# Patient Record
Sex: FEMALE | NOT HISPANIC OR LATINO | Employment: UNEMPLOYED | URBAN - METROPOLITAN AREA
[De-identification: names, ages, dates, MRNs, and addresses within clinical notes are randomized per-mention and may not be internally consistent; named-entity substitution may affect disease eponyms.]

---

## 2022-08-08 ENCOUNTER — TRANSFERRED RECORDS (OUTPATIENT)
Dept: HEALTH INFORMATION MANAGEMENT | Facility: CLINIC | Age: 37
End: 2022-08-08

## 2022-08-08 ENCOUNTER — MEDICAL CORRESPONDENCE (OUTPATIENT)
Dept: HEALTH INFORMATION MANAGEMENT | Facility: CLINIC | Age: 37
End: 2022-08-08

## 2022-09-15 ENCOUNTER — HOSPITAL ENCOUNTER (OUTPATIENT)
Dept: CARDIOLOGY | Facility: CLINIC | Age: 37
Discharge: HOME | End: 2022-09-15
Admitting: PEDIATRICS
Payer: COMMERCIAL

## 2022-09-15 ENCOUNTER — OFFICE VISIT (OUTPATIENT)
Dept: CARDIOLOGY | Facility: CLINIC | Age: 37
End: 2022-09-15
Payer: COMMERCIAL

## 2022-09-15 DIAGNOSIS — O09.529 AMA (ADVANCED MATERNAL AGE) MULTIGRAVIDA 35+: ICD-10-CM

## 2022-09-15 DIAGNOSIS — O24.410 DIET CONTROLLED GESTATIONAL DIABETES MELLITUS (GDM) IN THIRD TRIMESTER: Primary | ICD-10-CM

## 2022-09-15 DIAGNOSIS — O24.419 GDM (GESTATIONAL DIABETES MELLITUS): ICD-10-CM

## 2022-09-15 PROCEDURE — 93325 DOPPLER ECHO COLOR FLOW MAPG: CPT | Mod: 26 | Performed by: PEDIATRICS

## 2022-09-15 PROCEDURE — 99204 OFFICE O/P NEW MOD 45 MIN: CPT | Mod: 25 | Performed by: PEDIATRICS

## 2022-09-15 PROCEDURE — 76825 ECHO EXAM OF FETAL HEART: CPT | Mod: 26 | Performed by: PEDIATRICS

## 2022-09-15 PROCEDURE — 76827 ECHO EXAM OF FETAL HEART: CPT | Mod: 26 | Performed by: PEDIATRICS

## 2022-09-15 PROCEDURE — 93325 DOPPLER ECHO COLOR FLOW MAPG: CPT

## 2022-09-15 NOTE — PROGRESS NOTES
Fetal Cardiology Consultation    Patient:  Yaz Childress MRN:  [id number]   YOB: 1985 Age:  37 year old   Date of Visit:  9/15/2022 PCP:  No primary care provider on file.   SABINA: 12/10/22 EGA: 27+5 weeks     Dear Dr. Parry :    I had the pleasure of seeing Yaz Childress at the [facility] in [place] on 9/15/2022 in consultation for fetal echocardiography results. She presented today accompanied by her partner; today's visit was facilitated through an . As you know, she is a 37 year old female with gestational diabetes.    The fetal echocardiogram was normal. Normal fetal cardiac anatomy. Normal right and left ventricular size and function without hypertrophy. No evidence of diastolic dysfunction. No pericardial effusion. No arrhythmia.     I reviewed and interpreted the fetal echocardiogram today. I discussed the normal results with Ms. Monroe Childress and her partner with an . While these results are normal, it is important to note that fetal echocardiography cannot exclude small atrial or ventricular septal defects, persistent ductus arteriosus, mild coarctation of the aorta, partial anomalous pulmonary venous return, minor anatomic valve anomalies, or coronary artery anomalies.     Thank you for allowing me to participate in Ms. Monroe Childress's care. Please don't hesitate to contact me or the Fetal Cardiology team at [facility] with any questions or concerns.     I spent a total of 40 minutes on the date of the encounter doing chart review, patient history, documentation, counseling, and coordinating care.    Jamarcus Jon MD  Pediatric Cardiology  [facility]  Phone [phone]    Review of prior external note(s) from - Outside records from [facility]  Review of the result(s) of each unique test - fetal echocardiogram

## 2022-11-14 ENCOUNTER — VIRTUAL VISIT (OUTPATIENT)
Dept: UROLOGY | Facility: CLINIC | Age: 37
End: 2022-11-14
Payer: COMMERCIAL

## 2022-11-14 VITALS — WEIGHT: 166 LBS

## 2022-11-14 DIAGNOSIS — O35.EXX0 FETAL HYDRONEPHROSIS DURING PREGNANCY, ANTEPARTUM, SINGLE OR UNSPECIFIED FETUS: Primary | ICD-10-CM

## 2022-11-14 PROCEDURE — 99443 PR PHYSICIAN TELEPHONE EVALUATION 21-30 MIN: CPT | Performed by: NURSE PRACTITIONER

## 2022-11-14 RX ORDER — LEVOTHYROXINE SODIUM 50 UG/1
TABLET ORAL
COMMUNITY
Start: 2022-08-22

## 2022-11-14 RX ORDER — METFORMIN HCL 500 MG
TABLET, EXTENDED RELEASE 24 HR ORAL
COMMUNITY
Start: 2022-09-15

## 2022-11-14 ASSESSMENT — PAIN SCALES - GENERAL: PAINLEVEL: NO PAIN (0)

## 2022-11-14 NOTE — PROGRESS NOTES
Yaz is a 37 year old who is being evaluated via a billable telephone visit.      What phone number would you like to be contacted at? [phone]  How would you like to obtain your AVS? Mail a copy

## 2022-11-14 NOTE — PATIENT INSTRUCTIONS
[facility]   Department of Pediatric Urology    Dr. Hari Llamas, Pediatric Urologist  Osorio Moctezuma, CPNP  Swati Curiel, CADYNP    [facility] [place]  Nurse Coordinator: Mychal Pritchett -[phone]    [facility]  Nurse Coordinator: [phone]    [facility] [employer]  Nurse Coordinator: Lesley Fletcher -[phone]  Nurse Coordinator: Mychal Pritchett -[phone]    [place]  Nurse Coordinator: FILIBERTO PatelN, [phone]      Once your baby is born, please do the following:    -After you have gone home, please call the Nurse Coordinator at your preferred  location and give her your baby s name and date of birth. She will  help coordinate the tests that need to be done about 4 weeks  after birth.    Your baby s test will include:  -Renal Bladder Ultrasound  (all locations)

## 2022-11-14 NOTE — PROGRESS NOTES
Marquis Parry MD    [address] [place] [address] [address]    [place] [address]    [phone] (Work)    [phone] (Fax)    RE:  Yaz Childress  :  1985  [place] MRN:  [id number]  Date of visit:  2022    Dear Dr. Parry:    I had the pleasure of seeing your patient, Yaz, today through the [facility] in urology consultation via telephone visit for the question of prenatally detected unilateral hydronephrosis.   Please see below the details of this visit and my impression and plans discussed with the family.    CC: Prenatal hydronephrosis-unilateral    HPI:  Yaz is a 37 year old woman whom I was asked to see in consultation for the above.  This is Yaz 3rd pregnancy.  The sex of the fetus is Male.  At the 32 week ultrasound mild left dilatation of the left kidney's renal pelvis UTD A1, low risk. So far the pregnancy has been complicated by gestational diabetes.  Yaz is planning to deliver at [facility], her due date is 12/10/2022.  There is no family history of genitourinary disorders in childhood.    PMH:  No past medical history on file.    PSH:   No past surgical history on file.    Meds, allergies, family history, social history reviewed per intake form and confirmed in our EMR.    ROS:  Negative on a 12-point scale.  All other pertinent positives mentioned in the HPI.    PE:  There were no vitals taken for this visit.  There is no height or weight on file to calculate BMI.    GENERAL: Healthy, alert and no distress  RESP: No audible wheeze, cough.  PSYCH: Mentation appears normal, affect normal/bright, judgement and insight intact, normal speech.    Impression: Fetal detection of left pyelocaliectasis UTD A1 low risk, normal right kidney.    Plan:    We discussed the plan for  management including a renal ultrasound around 2-4 weeks of life with subsequent follow up in our [place] office. Phone number was given  to call our team once baby is born.     We discussed the likely prenatal causes for this, including prenatal obstructive issues that have already resolved versus those that may need surgical help with resolution in childhood, as well as the possibility of vesicoureteral reflux.  We discussed the risks for urinary tract infection, and the pros and cons of starting the baby on daily, low-dose Amoxicillin, dosed at 10-15 mg/kg/d, which in this case we will likely not do.      I addressed all questions and encouraged a phone call from their MFM if there are any future concerns during the pregnancy.    Thank you very much for allowing me the opportunity to participate in this nice family's care with you.      Type of service:  Telephone visit  Phone call duration: Start time: 12:45 via [employer]ese Interperter Stop Time:13:12  Total Time: 27 minutes  Originating Location (pt. Location): Home  Distant Location (provider location):  [facility]   Mode of Communication:  Doximity    45 minutes spent on the date of the encounter doing chart review, history and exam, documentation, education and further activities per the note.    Sincerely,  Swati LORENZANA, CPNP  Pediatric Urology  [facility]